# Patient Record
Sex: MALE | Race: BLACK OR AFRICAN AMERICAN | Employment: OTHER | URBAN - METROPOLITAN AREA
[De-identification: names, ages, dates, MRNs, and addresses within clinical notes are randomized per-mention and may not be internally consistent; named-entity substitution may affect disease eponyms.]

---

## 2022-03-19 PROBLEM — M79.605 PAIN OF LEFT LOWER EXTREMITY: Status: ACTIVE | Noted: 2017-01-17

## 2024-01-31 ENCOUNTER — ANESTHESIA EVENT (OUTPATIENT)
Facility: HOSPITAL | Age: 74
End: 2024-01-31
Payer: MEDICARE

## 2024-01-31 ENCOUNTER — TRANSCRIBE ORDERS (OUTPATIENT)
Facility: HOSPITAL | Age: 74
End: 2024-01-31

## 2024-01-31 ENCOUNTER — HOSPITAL ENCOUNTER (OUTPATIENT)
Facility: HOSPITAL | Age: 74
Discharge: HOME | End: 2024-02-03
Payer: MEDICARE

## 2024-01-31 DIAGNOSIS — K40.90 RIGHT INGUINAL HERNIA: Primary | ICD-10-CM

## 2024-01-31 DIAGNOSIS — K40.90 RIGHT INGUINAL HERNIA: ICD-10-CM

## 2024-01-31 DIAGNOSIS — D36.10 BENIGN NEOPLASM OF PERIPHERAL NERVE, UNSPECIFIED LOCATION: ICD-10-CM

## 2024-01-31 LAB
ANION GAP SERPL CALC-SCNC: 3 MMOL/L (ref 3–18)
BASOPHILS # BLD: 0 K/UL (ref 0–0.1)
BASOPHILS NFR BLD: 1 % (ref 0–2)
BUN SERPL-MCNC: 13 MG/DL (ref 7–18)
BUN/CREAT SERPL: 14 (ref 12–20)
CALCIUM SERPL-MCNC: 9.4 MG/DL (ref 8.5–10.1)
CHLORIDE SERPL-SCNC: 101 MMOL/L (ref 100–111)
CO2 SERPL-SCNC: 33 MMOL/L (ref 21–32)
CREAT SERPL-MCNC: 0.95 MG/DL (ref 0.6–1.3)
DIFFERENTIAL METHOD BLD: ABNORMAL
EKG ATRIAL RATE: 83 BPM
EKG DIAGNOSIS: NORMAL
EKG P AXIS: 55 DEGREES
EKG P-R INTERVAL: 148 MS
EKG Q-T INTERVAL: 362 MS
EKG QRS DURATION: 94 MS
EKG QTC CALCULATION (BAZETT): 425 MS
EKG R AXIS: 58 DEGREES
EKG T AXIS: 77 DEGREES
EKG VENTRICULAR RATE: 83 BPM
EOSINOPHIL # BLD: 0.2 K/UL (ref 0–0.4)
EOSINOPHIL NFR BLD: 6 % (ref 0–5)
ERYTHROCYTE [DISTWIDTH] IN BLOOD BY AUTOMATED COUNT: 12.2 % (ref 11.6–14.5)
GLUCOSE SERPL-MCNC: 77 MG/DL (ref 74–99)
HCT VFR BLD AUTO: 40.1 % (ref 36–48)
HGB BLD-MCNC: 12.8 G/DL (ref 13–16)
IMM GRANULOCYTES # BLD AUTO: 0 K/UL (ref 0–0.04)
IMM GRANULOCYTES NFR BLD AUTO: 0 % (ref 0–0.5)
LYMPHOCYTES # BLD: 1.6 K/UL (ref 0.9–3.6)
LYMPHOCYTES NFR BLD: 40 % (ref 21–52)
MCH RBC QN AUTO: 32.4 PG (ref 24–34)
MCHC RBC AUTO-ENTMCNC: 31.9 G/DL (ref 31–37)
MCV RBC AUTO: 101.5 FL (ref 78–100)
MONOCYTES # BLD: 0.6 K/UL (ref 0.05–1.2)
MONOCYTES NFR BLD: 15 % (ref 3–10)
NEUTS SEG # BLD: 1.6 K/UL (ref 1.8–8)
NEUTS SEG NFR BLD: 39 % (ref 40–73)
NRBC # BLD: 0 K/UL (ref 0–0.01)
NRBC BLD-RTO: 0 PER 100 WBC
PLATELET # BLD AUTO: 203 K/UL (ref 135–420)
PMV BLD AUTO: 9.6 FL (ref 9.2–11.8)
POTASSIUM SERPL-SCNC: 4.2 MMOL/L (ref 3.5–5.5)
RBC # BLD AUTO: 3.95 M/UL (ref 4.35–5.65)
SODIUM SERPL-SCNC: 137 MMOL/L (ref 136–145)
WBC # BLD AUTO: 4.1 K/UL (ref 4.6–13.2)

## 2024-01-31 PROCEDURE — 36415 COLL VENOUS BLD VENIPUNCTURE: CPT

## 2024-01-31 PROCEDURE — 80048 BASIC METABOLIC PNL TOTAL CA: CPT

## 2024-01-31 PROCEDURE — 93005 ELECTROCARDIOGRAM TRACING: CPT

## 2024-01-31 PROCEDURE — 85025 COMPLETE CBC W/AUTO DIFF WBC: CPT

## 2024-02-01 LAB
FAX TO NUMBER: NORMAL
TEST RESULTS FAXED TO: NORMAL

## 2024-02-07 ENCOUNTER — HOSPITAL ENCOUNTER (OUTPATIENT)
Facility: HOSPITAL | Age: 74
Setting detail: OUTPATIENT SURGERY
Discharge: HOME | End: 2024-02-07
Attending: SURGERY | Admitting: SURGERY
Payer: MEDICARE

## 2024-02-07 ENCOUNTER — ANESTHESIA (OUTPATIENT)
Facility: HOSPITAL | Age: 74
End: 2024-02-07
Payer: MEDICARE

## 2024-02-07 VITALS
TEMPERATURE: 97.5 F | HEART RATE: 73 BPM | SYSTOLIC BLOOD PRESSURE: 114 MMHG | BODY MASS INDEX: 23.39 KG/M2 | WEIGHT: 176.5 LBS | HEIGHT: 73 IN | RESPIRATION RATE: 15 BRPM | OXYGEN SATURATION: 100 % | DIASTOLIC BLOOD PRESSURE: 70 MMHG

## 2024-02-07 PROCEDURE — 88305 TISSUE EXAM BY PATHOLOGIST: CPT

## 2024-02-07 PROCEDURE — 3700000000 HC ANESTHESIA ATTENDED CARE: Performed by: SURGERY

## 2024-02-07 PROCEDURE — 6360000002 HC RX W HCPCS: Performed by: SURGERY

## 2024-02-07 PROCEDURE — 2580000003 HC RX 258: Performed by: SURGERY

## 2024-02-07 PROCEDURE — 3600000019 HC SURGERY ROBOT ADDTL 15MIN: Performed by: SURGERY

## 2024-02-07 PROCEDURE — 3600000009 HC SURGERY ROBOT BASE: Performed by: SURGERY

## 2024-02-07 PROCEDURE — 2709999900 HC NON-CHARGEABLE SUPPLY: Performed by: SURGERY

## 2024-02-07 PROCEDURE — 7100000010 HC PHASE II RECOVERY - FIRST 15 MIN: Performed by: SURGERY

## 2024-02-07 PROCEDURE — 88304 TISSUE EXAM BY PATHOLOGIST: CPT

## 2024-02-07 PROCEDURE — 2500000003 HC RX 250 WO HCPCS: Performed by: SURGERY

## 2024-02-07 PROCEDURE — 7100000011 HC PHASE II RECOVERY - ADDTL 15 MIN: Performed by: SURGERY

## 2024-02-07 PROCEDURE — C1781 MESH (IMPLANTABLE): HCPCS | Performed by: SURGERY

## 2024-02-07 PROCEDURE — 3700000001 HC ADD 15 MINUTES (ANESTHESIA): Performed by: SURGERY

## 2024-02-07 PROCEDURE — 6360000002 HC RX W HCPCS: Performed by: NURSE ANESTHETIST, CERTIFIED REGISTERED

## 2024-02-07 PROCEDURE — S2900 ROBOTIC SURGICAL SYSTEM: HCPCS | Performed by: SURGERY

## 2024-02-07 PROCEDURE — 2500000003 HC RX 250 WO HCPCS: Performed by: NURSE ANESTHETIST, CERTIFIED REGISTERED

## 2024-02-07 DEVICE — MESH SURG PROGRIP 6X6 CM POLY POLYLACTIC ACID MFIL KNIT RECTANG: Type: IMPLANTABLE DEVICE | Site: INGUINAL | Status: FUNCTIONAL

## 2024-02-07 RX ORDER — SODIUM CHLORIDE, SODIUM LACTATE, POTASSIUM CHLORIDE, CALCIUM CHLORIDE 600; 310; 30; 20 MG/100ML; MG/100ML; MG/100ML; MG/100ML
INJECTION, SOLUTION INTRAVENOUS CONTINUOUS
Status: CANCELLED | OUTPATIENT
Start: 2024-02-07

## 2024-02-07 RX ORDER — PROPOFOL 10 MG/ML
INJECTION, EMULSION INTRAVENOUS CONTINUOUS PRN
Status: DISCONTINUED | OUTPATIENT
Start: 2024-02-07 | End: 2024-02-07 | Stop reason: SDUPTHER

## 2024-02-07 RX ORDER — SODIUM CHLORIDE, SODIUM LACTATE, POTASSIUM CHLORIDE, CALCIUM CHLORIDE 600; 310; 30; 20 MG/100ML; MG/100ML; MG/100ML; MG/100ML
INJECTION, SOLUTION INTRAVENOUS CONTINUOUS
Status: DISCONTINUED | OUTPATIENT
Start: 2024-02-07 | End: 2024-02-07 | Stop reason: HOSPADM

## 2024-02-07 RX ORDER — FENTANYL CITRATE 50 UG/ML
INJECTION, SOLUTION INTRAMUSCULAR; INTRAVENOUS PRN
Status: DISCONTINUED | OUTPATIENT
Start: 2024-02-07 | End: 2024-02-07 | Stop reason: SDUPTHER

## 2024-02-07 RX ORDER — SODIUM CHLORIDE 9 MG/ML
INJECTION, SOLUTION INTRAVENOUS PRN
Status: CANCELLED | OUTPATIENT
Start: 2024-02-07

## 2024-02-07 RX ORDER — SODIUM CHLORIDE 0.9 % (FLUSH) 0.9 %
5-40 SYRINGE (ML) INJECTION EVERY 12 HOURS SCHEDULED
Status: CANCELLED | OUTPATIENT
Start: 2024-02-07

## 2024-02-07 RX ORDER — CIPROFLOXACIN 2 MG/ML
400 INJECTION, SOLUTION INTRAVENOUS ONCE
Status: COMPLETED | OUTPATIENT
Start: 2024-02-07 | End: 2024-02-07

## 2024-02-07 RX ORDER — HYDROMORPHONE HYDROCHLORIDE 1 MG/ML
0.25 INJECTION, SOLUTION INTRAMUSCULAR; INTRAVENOUS; SUBCUTANEOUS EVERY 5 MIN PRN
Status: CANCELLED | OUTPATIENT
Start: 2024-02-07

## 2024-02-07 RX ORDER — LABETALOL HYDROCHLORIDE 5 MG/ML
10 INJECTION, SOLUTION INTRAVENOUS
Status: CANCELLED | OUTPATIENT
Start: 2024-02-07

## 2024-02-07 RX ORDER — DIPHENHYDRAMINE HYDROCHLORIDE 50 MG/ML
12.5 INJECTION INTRAMUSCULAR; INTRAVENOUS
Status: CANCELLED | OUTPATIENT
Start: 2024-02-07 | End: 2024-02-08

## 2024-02-07 RX ORDER — ONDANSETRON 2 MG/ML
4 INJECTION INTRAMUSCULAR; INTRAVENOUS
Status: CANCELLED | OUTPATIENT
Start: 2024-02-07 | End: 2024-02-08

## 2024-02-07 RX ORDER — SODIUM CHLORIDE 0.9 % (FLUSH) 0.9 %
5-40 SYRINGE (ML) INJECTION PRN
Status: CANCELLED | OUTPATIENT
Start: 2024-02-07

## 2024-02-07 RX ORDER — LIDOCAINE HYDROCHLORIDE 20 MG/ML
INJECTION, SOLUTION EPIDURAL; INFILTRATION; INTRACAUDAL; PERINEURAL PRN
Status: DISCONTINUED | OUTPATIENT
Start: 2024-02-07 | End: 2024-02-07 | Stop reason: SDUPTHER

## 2024-02-07 RX ORDER — FENTANYL CITRATE 50 UG/ML
25 INJECTION, SOLUTION INTRAMUSCULAR; INTRAVENOUS EVERY 5 MIN PRN
Status: CANCELLED | OUTPATIENT
Start: 2024-02-07

## 2024-02-07 RX ORDER — DROPERIDOL 2.5 MG/ML
0.62 INJECTION, SOLUTION INTRAMUSCULAR; INTRAVENOUS
Status: CANCELLED | OUTPATIENT
Start: 2024-02-07 | End: 2024-02-08

## 2024-02-07 RX ADMIN — PROPOFOL 30 MG: 10 INJECTION, EMULSION INTRAVENOUS at 09:27

## 2024-02-07 RX ADMIN — PROPOFOL 75 MCG/KG/MIN: 10 INJECTION, EMULSION INTRAVENOUS at 09:25

## 2024-02-07 RX ADMIN — PROPOFOL 30 MG: 10 INJECTION, EMULSION INTRAVENOUS at 09:19

## 2024-02-07 RX ADMIN — CIPROFLOXACIN 400 MG: 2 INJECTION, SOLUTION INTRAVENOUS at 09:24

## 2024-02-07 RX ADMIN — FENTANYL CITRATE 25 MCG: 50 INJECTION, SOLUTION INTRAMUSCULAR; INTRAVENOUS at 09:39

## 2024-02-07 RX ADMIN — FENTANYL CITRATE 25 MCG: 50 INJECTION, SOLUTION INTRAMUSCULAR; INTRAVENOUS at 09:34

## 2024-02-07 RX ADMIN — FENTANYL CITRATE 25 MCG: 50 INJECTION, SOLUTION INTRAMUSCULAR; INTRAVENOUS at 09:28

## 2024-02-07 RX ADMIN — FENTANYL CITRATE 25 MCG: 50 INJECTION, SOLUTION INTRAMUSCULAR; INTRAVENOUS at 09:31

## 2024-02-07 RX ADMIN — SODIUM CHLORIDE, POTASSIUM CHLORIDE, SODIUM LACTATE AND CALCIUM CHLORIDE: 600; 310; 30; 20 INJECTION, SOLUTION INTRAVENOUS at 08:35

## 2024-02-07 RX ADMIN — SODIUM CHLORIDE, POTASSIUM CHLORIDE, SODIUM LACTATE AND CALCIUM CHLORIDE: 600; 310; 30; 20 INJECTION, SOLUTION INTRAVENOUS at 08:41

## 2024-02-07 RX ADMIN — LIDOCAINE HYDROCHLORIDE 60 MG: 20 INJECTION, SOLUTION EPIDURAL; INFILTRATION; INTRACAUDAL; PERINEURAL at 09:16

## 2024-02-07 ASSESSMENT — PAIN SCALES - GENERAL: PAINLEVEL_OUTOF10: 0

## 2024-02-07 ASSESSMENT — COPD QUESTIONNAIRES: CAT_SEVERITY: MILD

## 2024-02-07 NOTE — ANESTHESIA POSTPROCEDURE EVALUATION
Post-Anesthesia Evaluation and Assessment    Cardiovascular Function/Vital Signs/Pain Score:  Vitals  BP: 111/62  Temp: 97.2 °F (36.2 °C)  Temp Source: Temporal  Pulse: 73  Respirations: 15  SpO2: 98 %  Height: 185.4 cm (6' 1\")  Weight - Scale: 80.1 kg (176 lb 8 oz)  Pain Level: 0    Patient is status post Procedure(s):  OPEN REPAIR RIGHT INGUINAL HERNIA/ NEURECTOMY (SPEC POP MEDTRONIC DEFIBRILLATOR).    Nausea/Vomiting: Controlled.    Postoperative hydration reviewed and adequate.    Pain:  Managed    Mental Status and Level of Consciousness: Baseline and appropriate for discharge.    Adequate oxygenation and airway patent.    Complications related to anesthesia: None    Post-anesthesia assessment completed. No concerns.    Patient has met all discharge requirements.    Signed By: Erwin Ribeiro MD    February 7, 2024

## 2024-02-07 NOTE — DISCHARGE INSTRUCTIONS
Post-Operative Discharge Instructions  Wagner Zee M.D.  [address], [address], [place] [address] [phone]    Patient: Catherine March MRN: [id number]  [facility]: [id number]    YOB: 1950  Age: 73 y.o.  Sex: male    DOA: 2/7/2024 LOS: [unfilled]  Discharge Date: [unfilled]     Acute Diagnoses:  Inguinal hernia without obstruction or gangrene, recurrence not specified, unspecified laterality [K40.90]    Chronic Medical Diagnoses:  [unfilled]    Diet  Resume prior to surgery diet as tolerated.    Activity  Do not drive a car or operate any hazardous machinery the day of surgery.  Rest quietly today.  No bending or heavy lifting.  You may resume other prior to surgery activities as tolerated.  You may remove the bandage and shower in 1 day.    Drain / Wound Care  Follow all drain / wound care instructions exactly as explained by the Nurse at time of discharge.  Apply an ice pack to the surgical site for 48 hours.  Do not put any salves or ointments on the wound.  Allow it to form a dry scab.  Leave steri-strips / Dermabond alone.  They should be allowed to fall off on their own in 7-14 days.    Medications  It is important to take your medications exactly as they are prescribed.  Keep your medication in the bottles provided by the pharmacist, and keep a list of the medication names, dosages, and times they should be taken in your wallet.    Call 911 anytime you think you may need emergency care. For example, call if:  You passed out (lost consciousness).  You have severe trouble breathing.  You have sudden chest pain and shortness of breath.    Notify your Surgeon for any of the following:  Fever, chills, nausea, vomiting, severe abdominal pain or bleeding.  If you experience any redness or discharge or sign of infection.  Persistent nausea lasting more than 24 hours.    If you are unable to reach your Surgeon for any of the symptoms above, you should proceed directly to the nearest

## 2024-02-07 NOTE — BRIEF OP NOTE
Brief Postoperative Note      Patient: Catherine March  YOB: 1950  MRN: [id number]    Date of Procedure: 2/7/2024    Pre-Op Diagnosis Codes:     * Inguinal hernia without obstruction or gangrene, recurrence not specified, unspecified laterality [K40.90]    Post-Op Diagnosis: Same       Procedure(s):  OPEN REPAIR RIGHT INGUINAL HERNIA/ NEURECTOMY (SPEC POP MEDTRONIC DEFIBRILLATOR)    Surgeon(s):  Wagner Zee MD    Assistant:  Surgical Assistant: Marlena Winters    Anesthesia: General    Estimated Blood Loss (mL): Minimal    Complications: None    Specimens:   ID Type Source Tests Collected by Time Destination   A : right ilioinguinal nerve Tissue Abdomen SURGICAL PATHOLOGY Wagner Zee MD 2/7/2024 0936    B : right inguinal lipoma Tissue Abdomen SURGICAL PATHOLOGY Wagner Zee MD 2/7/2024 0938        Implants:  Implant Name Type Inv. Item Serial No.  Lot No. LRB No. Used Action   MESH SURG PROGRIP 6X6 CM POLY POLYLACTIC ACID MFIL KNIT RECTANG - [id number]  MESH SURG PROGRIP 6X6 CM POLY POLYLACTIC ACID MFIL KNIT RECTANG  MEDTRONIC COVIDIEN  SURGICAL-WD [id number] Right 1 Implanted         Drains: * No LDAs found *    Findings: RI      Electronically signed by Wagner Zee MD on 2/7/2024 at 9:51 AM

## 2024-02-07 NOTE — OP NOTE
OPERATIVE NOTE    Patient: Catherine Mrach MRN: [id number]  [facility]: [id number]    YOB: 1950  Age: 73 y.o.  Sex: male      Indications: This is a 73 y.o. year-old male who presents with Right inguinal hernia.     Date of Procedure: 2/7/2024     Preoperative Diagnosis: Right Inguinal Hernia/Neuroma    Postoperative Diagnosis:  Right  Inguinal Hernia/Neuroma     Procedure: Procedure(s):  OPEN REPAIR RIGHT INGUINAL HERNIA/ NEURECTOMY (SPEC POP MEDTRONIC DEFIBRILLATOR)    Surgeon(s): Surgeon(s) and Role:     * Wagner Zee MD - Primary    Anesthesia: General     Assistant:  Surgical Assistant: Marlena Winters  Scrub Person First: Bety Brooks  Scrub Person Second: Krystal Lancaster RN    Implants:  Implant Name Type Inv. Item Serial No.  Lot No. LRB No. Used Action   MESH SURG PROGRIP 6X6 CM POLY POLYLACTIC ACID MFIL KNIT RECTANG - [id number]  MESH SURG PROGRIP 6X6 CM POLY POLYLACTIC ACID MFIL KNIT RECTANG  MEDTR[employer] COVIDIEN  SURGICAL-WD [id number] Right 1 Implanted       Procedure: The patient was placed in the supine position. His abdomen and groin were prepped and draped in the usual fashion. An incision was made over the inguinal ligament with the knife and then carried down through the subcutaneous tissues with the electrocautery. The external oblique fibers were slightly divided and the patient was found to have a direct and indirect inguinal hernia. The spermatic cord and structures were retracted laterally using a soft Penrose drain. The ilioinguinal nerve was resected using the electrocautery. The indirect hernia sac was dissected away from the cord structures and ligated high at the internal ring using a 2-0 PDS suture ligature. The defect in the inguinal floor was reduced and then repaired using Progrip mesh. The mesh was fashioned, placed over the inguinal floor and allowed to south  to the pubic tubercle shelving edge of the inguinal ligament and transversalis fascia

## 2024-02-07 NOTE — H&P
History of Present Illness  1.  Inguinal hernia   , RLQ  There is radiation to groin.  The patient describes it as aching, dull and sharp.  Context includes physical activity.  Additional information: Has a LIH for some time and as wanted it repaired..        Problem List  Problem List reviewed.   Problem Description  Onset Date  Chronic  Clinical Status  Notes  Essential hypertension  04/04/2014  N      Depressive disorder  04/04/2014  Y      Arthropathy  04/04/2014  Y      Neck pain  04/04/2014  Y      Low back pain  04/04/2014  Y      Allergic rhinitis  04/04/2014  Y      Acquired arteriovenous fistula  06/09/2021  N      H/O lower GIT neoplasm  06/09/2021  N          Past Medical/Surgical History   (Detailed)  Disease/disorder  Onset Date  Management  Date  Comments      Knee replacement, total      Depression          High Blood Pressure              Knee replacement, total      Osteoarthritis              Colonoscopy with possible biopsies and/or polypectomies as needed          Active Medications (Prior to visit today)  Medication Name  Sig Description  Start Date  Stop Date  Refilled  Rx Elsewhere  docusate sodium 100 mg capsule  take 1 capsule by ORAL route 2 times every day at bedtime as needed  06/10/2016    06/10/2016  N  cetirizine 10 mg tablet  take 1 tablet by oral route  every day  10/15/2019      N  fluticasone propionate 50 mcg/actuation nasal spray,suspension  spray 1 spray by intranasal route  every day in each nostril  10/15/2019    10/15/2019  N  Colace 100 mg capsule  take 1 capsule by ORAL route 2 times every day at bedtime as needed  01/19/2021      N  Pyridium 200 mg tablet  take 1 tablet by oral route 3 times every day after meals  04/06/2021      N  ProAir HFA 90 mcg/actuation aerosol inhaler  inhale 2 puff by inhalation route  every 4 - 6 hours as needed  06/29/2021 06/29/2021  N  meclizine 25 mg tablet  take 1 tablet by oral route 3 times every day as

## 2024-02-07 NOTE — PERIOP NOTE
TRANSFER - IN REPORT:    Verbal report received from BIJAN Mast on Catherine March  being received from OR for routine progression of patient care      Report consisted of patient's Situation, Background, Assessment and   Recommendations(SBAR).     Information from the following report(s) Intake/Output and MAR was reviewed with the receiving nurse.    Opportunity for questions and clarification was provided.      Assessment completed upon patient's arrival to unit and care assumed.

## 2024-02-07 NOTE — PERIOP NOTE
Saadia informed via phone of pt last had tumeric three days ago and Boswellia two days ago. No new order were received.

## 2024-02-07 NOTE — ANESTHESIA PRE PROCEDURE
Department of Anesthesiology  Preprocedure Note       Name:  Catherine March   Age:  73 y.o.  :  1950                                          MRN:  [id number]         Date:  2024      Surgeon: Surgeon(s):  Wagner Zee MD    Procedure: Procedure(s):  ROBOTIC REPAIR RIGHT INGUINAL HERNIA/ NEURECTOMY (SPEC POP MEDTRONIC DEFIBRILLATOR)    Medications prior to admission:   Prior to Admission medications    Medication Sig Start Date End Date Taking? Authorizing Provider   acetaminophen (TYLENOL) 500 MG tablet Take 1 tablet by mouth every 4 hours as needed for Pain    Veronica Pittman MD   albuterol (PROVENTIL) (2.5 MG/3ML) 0.083% nebulizer solution Inhale 3 mLs into the lungs as needed    Veronica Pittman MD   budesonide (PULMICORT) 0.5 MG/2ML nebulizer suspension Take 2 mLs by nebulization 2 times daily    Veronica Pittman MD   budesonide-formoterol (SYMBICORT) 160-4.5 MCG/ACT AERO Inhale 2 puffs into the lungs 2 times daily 23   Veronica Pittman MD   carvedilol (COREG) 3.125 MG tablet Take 1 tablet by mouth 2 times daily 23   Veronica Pittman MD   docusate (COLACE, DULCOLAX) 100 MG CAPS Take 100 mg by mouth 2 times daily    Veronica Pittman MD   EPINEPHrine (EPIPEN) 0.3 MG/0.3ML SOAJ injection Inject 0.3 mLs into the skin once 23   Veronica Pittman MD   dupilumab (DUPIXENT) 300 MG/2ML SOSY injection Inject 2 mLs into the skin every 14 days 23  Veronica Pittman MD   sacubitril-valsartan (ENTRESTO) 24-26 MG per tablet Take 1 tablet by mouth 2 times daily    Veronica Pittman MD   furosemide (LASIX) 20 MG tablet Take 1 tablet by mouth daily    Veronica Pittman MD   fluticasone (FLONASE) 50 MCG/ACT nasal spray 2 sprays by Each Nostril route daily    Veronica Pittman MD   vitamin B-1 (THIAMINE) 100 MG tablet Take 1 tablet by mouth daily    Veronica Pittman MD   vitamin D (CHOLECALCIFEROL) 25 MCG (1000 UT) TABS tablet

## 2024-04-25 ENCOUNTER — HOSPITAL ENCOUNTER (OUTPATIENT)
Facility: HOSPITAL | Age: 74
Discharge: HOME | End: 2024-04-28

## 2024-04-25 ENCOUNTER — TRANSCRIBE ORDERS (OUTPATIENT)
Facility: HOSPITAL | Age: 74
End: 2024-04-25

## 2024-04-25 DIAGNOSIS — R97.20 ELEVATED PROSTATE SPECIFIC ANTIGEN (PSA): ICD-10-CM

## 2024-04-25 DIAGNOSIS — R97.20 ELEVATED PROSTATE SPECIFIC ANTIGEN (PSA): Primary | ICD-10-CM

## 2024-04-29 LAB
EKG ATRIAL RATE: 81 BPM
EKG DIAGNOSIS: NORMAL
EKG P AXIS: 57 DEGREES
EKG P-R INTERVAL: 156 MS
EKG Q-T INTERVAL: 364 MS
EKG QRS DURATION: 102 MS
EKG QTC CALCULATION (BAZETT): 422 MS
EKG R AXIS: 52 DEGREES
EKG T AXIS: 161 DEGREES
EKG VENTRICULAR RATE: 81 BPM

## 2024-06-19 ENCOUNTER — ANESTHESIA EVENT (OUTPATIENT)
Facility: HOSPITAL | Age: 74
End: 2024-06-19
Payer: MEDICARE

## 2024-06-25 ENCOUNTER — ANESTHESIA (OUTPATIENT)
Facility: HOSPITAL | Age: 74
End: 2024-06-25
Payer: MEDICARE

## 2024-06-25 ENCOUNTER — HOSPITAL ENCOUNTER (OUTPATIENT)
Facility: HOSPITAL | Age: 74
Setting detail: OUTPATIENT SURGERY
Discharge: HOME | End: 2024-06-25
Attending: UROLOGY | Admitting: UROLOGY
Payer: MEDICARE

## 2024-06-25 ENCOUNTER — APPOINTMENT (OUTPATIENT)
Facility: HOSPITAL | Age: 74
End: 2024-06-25
Attending: UROLOGY
Payer: MEDICARE

## 2024-06-25 VITALS
HEIGHT: 73 IN | SYSTOLIC BLOOD PRESSURE: 134 MMHG | OXYGEN SATURATION: 98 % | RESPIRATION RATE: 16 BRPM | WEIGHT: 181 LBS | BODY MASS INDEX: 23.99 KG/M2 | DIASTOLIC BLOOD PRESSURE: 84 MMHG | TEMPERATURE: 97.1 F | HEART RATE: 77 BPM

## 2024-06-25 DIAGNOSIS — R97.20 ELEVATED PSA: ICD-10-CM

## 2024-06-25 DIAGNOSIS — N40.1 BENIGN PROSTATIC HYPERPLASIA WITH LOWER URINARY TRACT SYMPTOMS, SYMPTOM DETAILS UNSPECIFIED: ICD-10-CM

## 2024-06-25 PROCEDURE — 3700000001 HC ADD 15 MINUTES (ANESTHESIA): Performed by: UROLOGY

## 2024-06-25 PROCEDURE — 3700000000 HC ANESTHESIA ATTENDED CARE: Performed by: UROLOGY

## 2024-06-25 PROCEDURE — 2720000010 HC SURG SUPPLY STERILE: Performed by: UROLOGY

## 2024-06-25 PROCEDURE — 7100000001 HC PACU RECOVERY - ADDTL 15 MIN: Performed by: UROLOGY

## 2024-06-25 PROCEDURE — 3600000012 HC SURGERY LEVEL 2 ADDTL 15MIN: Performed by: UROLOGY

## 2024-06-25 PROCEDURE — 7100000011 HC PHASE II RECOVERY - ADDTL 15 MIN: Performed by: UROLOGY

## 2024-06-25 PROCEDURE — 2580000003 HC RX 258: Performed by: UROLOGY

## 2024-06-25 PROCEDURE — 6360000002 HC RX W HCPCS: Performed by: NURSE ANESTHETIST, CERTIFIED REGISTERED

## 2024-06-25 PROCEDURE — 2709999900 HC NON-CHARGEABLE SUPPLY: Performed by: UROLOGY

## 2024-06-25 PROCEDURE — 3600000002 HC SURGERY LEVEL 2 BASE: Performed by: UROLOGY

## 2024-06-25 PROCEDURE — G0416 PROSTATE BIOPSY, ANY MTHD: HCPCS

## 2024-06-25 PROCEDURE — 6370000000 HC RX 637 (ALT 250 FOR IP): Performed by: ANESTHESIOLOGY

## 2024-06-25 PROCEDURE — 7100000010 HC PHASE II RECOVERY - FIRST 15 MIN: Performed by: UROLOGY

## 2024-06-25 PROCEDURE — 2500000003 HC RX 250 WO HCPCS: Performed by: NURSE ANESTHETIST, CERTIFIED REGISTERED

## 2024-06-25 PROCEDURE — 6360000002 HC RX W HCPCS: Performed by: UROLOGY

## 2024-06-25 PROCEDURE — 7100000000 HC PACU RECOVERY - FIRST 15 MIN: Performed by: UROLOGY

## 2024-06-25 PROCEDURE — 76942 ECHO GUIDE FOR BIOPSY: CPT

## 2024-06-25 RX ORDER — ACETAMINOPHEN 325 MG/1
650 TABLET ORAL
Status: COMPLETED | OUTPATIENT
Start: 2024-06-25 | End: 2024-06-25

## 2024-06-25 RX ORDER — LIDOCAINE HYDROCHLORIDE 20 MG/ML
INJECTION, SOLUTION EPIDURAL; INFILTRATION; INTRACAUDAL; PERINEURAL PRN
Status: DISCONTINUED | OUTPATIENT
Start: 2024-06-25 | End: 2024-06-25 | Stop reason: SDUPTHER

## 2024-06-25 RX ORDER — SODIUM CHLORIDE 9 MG/ML
INJECTION, SOLUTION INTRAVENOUS CONTINUOUS
Status: DISCONTINUED | OUTPATIENT
Start: 2024-06-25 | End: 2024-06-25 | Stop reason: HOSPADM

## 2024-06-25 RX ORDER — PHENYLEPHRINE HCL IN 0.9% NACL 1 MG/10 ML
SYRINGE (ML) INTRAVENOUS PRN
Status: DISCONTINUED | OUTPATIENT
Start: 2024-06-25 | End: 2024-06-25 | Stop reason: SDUPTHER

## 2024-06-25 RX ORDER — MIDAZOLAM HYDROCHLORIDE 1 MG/ML
INJECTION INTRAMUSCULAR; INTRAVENOUS PRN
Status: DISCONTINUED | OUTPATIENT
Start: 2024-06-25 | End: 2024-06-25 | Stop reason: SDUPTHER

## 2024-06-25 RX ORDER — ONDANSETRON 2 MG/ML
INJECTION INTRAMUSCULAR; INTRAVENOUS PRN
Status: DISCONTINUED | OUTPATIENT
Start: 2024-06-25 | End: 2024-06-25 | Stop reason: SDUPTHER

## 2024-06-25 RX ORDER — FENTANYL CITRATE 50 UG/ML
INJECTION, SOLUTION INTRAMUSCULAR; INTRAVENOUS PRN
Status: DISCONTINUED | OUTPATIENT
Start: 2024-06-25 | End: 2024-06-25 | Stop reason: SDUPTHER

## 2024-06-25 RX ORDER — EPHEDRINE SULFATE 5 MG/ML
INJECTION INTRAVENOUS PRN
Status: DISCONTINUED | OUTPATIENT
Start: 2024-06-25 | End: 2024-06-25 | Stop reason: SDUPTHER

## 2024-06-25 RX ORDER — PROPOFOL 10 MG/ML
INJECTION, EMULSION INTRAVENOUS PRN
Status: DISCONTINUED | OUTPATIENT
Start: 2024-06-25 | End: 2024-06-25 | Stop reason: SDUPTHER

## 2024-06-25 RX ORDER — DEXAMETHASONE SODIUM PHOSPHATE 4 MG/ML
INJECTION, SOLUTION INTRA-ARTICULAR; INTRALESIONAL; INTRAMUSCULAR; INTRAVENOUS; SOFT TISSUE PRN
Status: DISCONTINUED | OUTPATIENT
Start: 2024-06-25 | End: 2024-06-25 | Stop reason: SDUPTHER

## 2024-06-25 RX ADMIN — Medication 100 MCG: at 07:36

## 2024-06-25 RX ADMIN — Medication 100 MCG: at 07:48

## 2024-06-25 RX ADMIN — FENTANYL CITRATE 25 MCG: 50 INJECTION, SOLUTION INTRAMUSCULAR; INTRAVENOUS at 07:42

## 2024-06-25 RX ADMIN — LIDOCAINE HYDROCHLORIDE 80 MG: 20 INJECTION, SOLUTION EPIDURAL; INFILTRATION; INTRACAUDAL; PERINEURAL at 07:36

## 2024-06-25 RX ADMIN — Medication 100 MCG: at 08:05

## 2024-06-25 RX ADMIN — EPHEDRINE SULFATE 10 MG: 5 INJECTION INTRAVENOUS at 07:51

## 2024-06-25 RX ADMIN — SODIUM CHLORIDE: 9 INJECTION, SOLUTION INTRAVENOUS at 06:59

## 2024-06-25 RX ADMIN — ACETAMINOPHEN 650 MG: 325 TABLET ORAL at 09:47

## 2024-06-25 RX ADMIN — MIDAZOLAM 2 MG: 1 INJECTION INTRAMUSCULAR; INTRAVENOUS at 07:28

## 2024-06-25 RX ADMIN — ONDANSETRON HYDROCHLORIDE 4 MG: 2 INJECTION INTRAMUSCULAR; INTRAVENOUS at 07:41

## 2024-06-25 RX ADMIN — Medication 100 MCG: at 07:45

## 2024-06-25 RX ADMIN — FENTANYL CITRATE 25 MCG: 50 INJECTION, SOLUTION INTRAMUSCULAR; INTRAVENOUS at 08:22

## 2024-06-25 RX ADMIN — DEXAMETHASONE SODIUM PHOSPHATE 4 MG: 4 INJECTION INTRA-ARTICULAR; INTRALESIONAL; INTRAMUSCULAR; INTRAVENOUS; SOFT TISSUE at 07:40

## 2024-06-25 RX ADMIN — Medication 100 MCG: at 07:42

## 2024-06-25 RX ADMIN — WATER 2000 MG: 1 INJECTION INTRAMUSCULAR; INTRAVENOUS; SUBCUTANEOUS at 07:40

## 2024-06-25 RX ADMIN — PROPOFOL 200 MG: 10 INJECTION, EMULSION INTRAVENOUS at 07:36

## 2024-06-25 RX ADMIN — FENTANYL CITRATE 25 MCG: 50 INJECTION, SOLUTION INTRAMUSCULAR; INTRAVENOUS at 08:01

## 2024-06-25 ASSESSMENT — PAIN DESCRIPTION - DESCRIPTORS
DESCRIPTORS: BURNING;ACHING
DESCRIPTORS: BURNING;ACHING
DESCRIPTORS: ACHING

## 2024-06-25 ASSESSMENT — PAIN SCALES - GENERAL
PAINLEVEL_OUTOF10: 0
PAINLEVEL_OUTOF10: 3

## 2024-06-25 ASSESSMENT — PAIN - FUNCTIONAL ASSESSMENT
PAIN_FUNCTIONAL_ASSESSMENT: ACTIVITIES ARE NOT PREVENTED
PAIN_FUNCTIONAL_ASSESSMENT: 0-10
PAIN_FUNCTIONAL_ASSESSMENT: ACTIVITIES ARE NOT PREVENTED

## 2024-06-25 ASSESSMENT — COPD QUESTIONNAIRES: CAT_SEVERITY: MODERATE

## 2024-06-25 ASSESSMENT — PAIN DESCRIPTION - LOCATION: LOCATION: ABDOMEN;PENIS

## 2024-06-25 NOTE — PERIOP NOTE
Dr. Norman aware of last dose of Entresto and Coreg on 6/23/2024, hold both medications at this time. And will evaluate later if needed.

## 2024-06-25 NOTE — PERIOP NOTE
Reviewed PTA medication list with patient/caregiver and patient/caregiver denies any additional medications.     Patient admits to having a responsible adult care for them at home for at least 24 hours after surgery.    Patient encouraged to use gown warming system and informed that using said warming gown to regulate body temperature prior to a procedure has been shown to help reduce the risks of blood clots and infection.    Patient's pharmacy of choice verified and documented in PTA medication section.    Dual skin assessment & fall risk band verification completed with Matilda DEL REAL RN.

## 2024-06-25 NOTE — INTERVAL H&P NOTE
Update History & Physical    The patient's History and Physical of June 24, 2024 was reviewed with the patient and I examined the patient. There was no change. The surgical site was confirmed by the patient and me.     Plan: The risks, benefits, expected outcome, and alternative to the recommended procedure have been discussed with the patient. Patient understands and wants to proceed with the procedure.     Electronically signed by RUBEN TITUS MD on 6/25/2024 at 7:05 AM

## 2024-06-25 NOTE — ANESTHESIA POSTPROCEDURE EVALUATION
Department of Anesthesiology  Postprocedure Note    Patient: Catherine March  MRN: [id number]  YOB: 1950  Date of evaluation: 6/25/2024    Procedure Summary       Date: 06/25/24 Room / Location: [facility] MAIN CYSTO / [facility] MAIN OR    Anesthesia Start: 0728 Anesthesia Stop: 0844    Procedures:       TRANSRECTAL ULTRASOUND GUIDED PROSTATE BIOPSY (Rectum)      GREENLIGHT LASER VAPORIZATION OF THE PROSTATE \"SPEC POP\" PT HAS MEDTRONIC AICD (Bladder) Diagnosis:       Elevated PSA      Benign prostatic hyperplasia with lower urinary tract symptoms, symptom details unspecified      (Elevated PSA [R97.20])      (Benign prostatic hyperplasia with lower urinary tract symptoms, symptom details unspecified [N40.1])    Surgeons: Sherif Hubbard MD Responsible Provider: Jamaal Norman MD    Anesthesia Type: General ASA Status: 3            Anesthesia Type: General    Elizabeth Phase I: Elizabeth Score: 9    Elizabeth Phase II: Elizabeth Score: 9    Anesthesia Post Evaluation    Patient location during evaluation: PACU  Patient participation: complete - patient participated  Level of consciousness: awake and alert  Pain score: 0  Airway patency: patent  Nausea & Vomiting: no nausea and no vomiting  Cardiovascular status: blood pressure returned to baseline  Respiratory status: acceptable  Hydration status: euvolemic  Pain management: adequate        No notable events documented.

## 2024-06-25 NOTE — H&P
300 mg/2 mL subcutaneous syringe inject 2 milliliter by subcutaneous route  every 2 weeks in the abdomen, thigh, or upper arm rotating injection sites //   Y   Entresto 24 mg-26 mg tablet take 1 tablet by oral route 2 times every day //   Y   furosemide 20 mg tablet take 1 tablet by oral route  every day //   Y   montelukast 10 mg tablet take 1 tablet by oral route  every day in the evening //   Y   omeprazole 20 mg capsule,delayed release take 1 capsule by oral route  every day before a meal //   Y   apple cider vinegar 500 mg tablet take 1 tablet by mouth daily //   Y   Azo Men 500 mg capsule take 1 capsule by mouth daily //   Y   boswellia angeline extract (bulk) 70 % powder Take 1 scoop full once daily //   Y   Glucosamine 500 mg tablet take 2 tablets by mouth daily //   Y   saw palmetto 500 mg capsule take 1 capsule by mouth daily //   Y   Vitamin B-1 100 mg tablet take 1 tablet by mouth daily //   Y   Vitamin D3 25 mcg (1,000 unit) capsule take 3 capsules by mouth daily //   Y   Fleet Enema 19 gram-7 gram/118 mL take by Rectal route  3 hours prior to biopsy 2024   N   Cipro 500 mg tablet take 1 tablet by oral route  every 12 hours -- start the day before the biopsy 2024   N       Medication Reconciliation  Medications reconciled today.      Allergies  Ingredient Reaction (Severity) Comment   NO KNOWN ALLERGIES       Reviewed, no changes.      Family History   (Reviewed, updated)    Relationship Family Member Name  Age at Death Condition Onset Age Cause of Death   Brother    malignant neoplasm of lung 67 N   Sister    Cancer  N     Social History  (Reviewed, updated)  Tobacco use reviewed.    Preferred language is English.    The patient does not need an .      Marital Status/Family/Social Support  Marital status:      Tobacco use status: Never smoked tobacco.    Smoking status: Never smoker.    Tobacco Screening  Patient has never used tobacco. Patient has not used tobacco

## 2024-06-25 NOTE — ANESTHESIA PRE PROCEDURE
cardioverter/defibrillator) present 12/06/2023    has Medtronic AICD implanted by Dr. Lillie Green at [facility]   • Anxiety and depression     no meds as of 4/29/24.   • Arthritis     \"throughout my body\".   • Asthma 2009    adult onset   • CAD (coronary artery disease) 2023    hx heart cath 4/7/2023. [facility] cardiologist Dr. Dan   • Cardiomyopathy ([facility]) 2023    \"Irregular heart beat\" underwent Medtronic AICD   • Cerebral aneurysm 2018    hx cerebral angiogram by Dr. Keene. \"I haven't seen him in a while\"   • CHF (congestive heart failure) ([facility]) 2023    Lasix & Entresto   • Chronic pain 2007    due to arthritis. hx bilateral knee replacement   • COPD (chronic obstructive pulmonary disease) ([facility]) 2013    uses Symbicort BID. managed by Dr. Eric at [facility]   • Elevated PSA 2024    prostate biopsy & greenlight laser vaporization procedure scheduled 5/14/24 with Dr. Hubbard   • GERD (gastroesophageal reflux disease) 2012    Pantoprazole daily   • Hyperlipidemia     no meds as of 4/29/24   • Hypertension 1999    PCP Dr. Louie Koch   • TEZ on CPAP 2011    bring cpap on dos   • Urinary frequency    • Wears partial dentures     partial two front teeth; no adhesive used   • Wears prescription eyeglasses        Past Surgical History:        Procedure Laterality Date   • CARDIAC CATHETERIZATION Bilateral 04/07/2023    Dr. Dan   • CARDIAC DEFIBRILLATOR PLACEMENT  10/06/2023    Medtronic AICD implanted at [facility] by Dr. Lillie Green   • CATARACT EXTRACTION, BILATERAL Bilateral 2011   • CEREBRAL ANGIOGRAM  10/12/2018    Dr. Keene   • COLONOSCOPY  2011   • ESOPHAGOGASTRODUODENOSCOPY  2011   • HERNIA REPAIR N/A 02/07/2024    open repair right inguinal hernia-Dr. Zee   • LASIK     • SINUS SURGERY     • SPINE SURGERY  2002    [facility]   • TOTAL KNEE ARTHROPLASTY Bilateral 2007    left knee 2007, right knee 2014       Social History:    Social History     Tobacco Use   • Smoking status:

## 2024-06-25 NOTE — PERIOP NOTE
TRANSFER - IN REPORT:    Verbal report received from Carole shabazz on Catherine March  being received from pacu for routine post-op      Report consisted of patient's Situation, Background, Assessment and   Recommendations(SBAR).     Information from the following report(s) Nurse Handoff Report was reviewed with the receiving nurse.    Opportunity for questions and clarification was provided.      Assessment completed upon patient's arrival to unit and care assumed.

## 2024-06-25 NOTE — DISCHARGE INSTRUCTIONS
Hydrate well.     Continue to take the Levofloxacin antibiotic every day until it runs out.        DISCHARGE SUMMARY from Nurse    PATIENT INSTRUCTIONS:    After general anesthesia or intravenous sedation, for 24 hours or while taking prescription Narcotics:  Limit your activities  Do not drive and operate hazardous machinery  Do not make important personal or business decisions  Do  not drink alcoholic beverages  If you have not urinated within 8 hours after discharge, please contact your surgeon on call.    Report the following to your surgeon:  Excessive pain, swelling, redness or odor of or around the surgical area  Temperature over 100.5  Nausea and vomiting lasting longer than 4 hours or if unable to take medications  Any signs of decreased circulation or nerve impairment to extremity: change in color, persistent  numbness, tingling, coldness or increase pain  Any questions    What to do at Home:  Recommended activity: ,   REGULAR DIET   EMPTY LEG BAG AS NEEDED  OK TO SHOWER  DR TITUS WILL CALL REGARDING A POST OP CHECK UP    If you experience any of the following symptoms heavy bleeding, no urine produced, fevers, severe pain, please follow up with dr titus.    *  Please give a list of your current medications to your Primary Care Provider.    *  Please update this list whenever your medications are discontinued, doses are      changed, or new medications (including over-the-counter products) are added.    *  Please carry medication information at all times in case of emergency situations.    These are general instructions for a healthy lifestyle:    No smoking/ No tobacco products/ Avoid exposure to second hand smoke  Surgeon General's Warning:  Quitting smoking now greatly reduces serious risk to your health.    Obesity, smoking, and sedentary lifestyle greatly increases your risk for illness    A healthy diet, regular physical exercise & weight monitoring are important for maintaining a healthy

## 2024-06-25 NOTE — OP NOTE
Operative Note      Patient: Catherine March  YOB: 1950  MRN: [id number]    Date of Procedure: 6/25/2024    Pre-Op Diagnosis Codes:     * Elevated PSA [R97.20]     * Benign prostatic hyperplasia with lower urinary tract symptoms, symptom details unspecified [N40.1]    Post-Op Diagnosis: Same       Procedure(s):  GREENLIGHT LASER VAPORIZATION OF THE PROSTATE  TRANSRECTAL ULTRASOUND, PROSTATE BIOPSY    Surgeon(s):  Sherif Hubbard MD    Assistant:   * No surgical staff found *    Anesthesia: general    Estimated Blood Loss (mL): Minimal    Complications: None    Specimens:   ID Type Source Tests Collected by Time Destination   A : right base Tissue Prostate SURGICAL PATHOLOGY Sherif Hubbard MD 6/25/2024 0717    B : left apex Tissue Prostate SURGICAL PATHOLOGY Sherif Hubbard MD 6/25/2024 0718    C : left middle Tissue Prostate SURGICAL PATHOLOGY Sherif Hubbard MD 6/25/2024 0718    D : left base Tissue Prostate SURGICAL PATHOLOGY Sherif Hubbard MD 6/25/2024 0718    E : right apex Tissue Prostate SURGICAL PATHOLOGY Sherif Hubbard MD 6/25/2024 0718    F : right middle Tissue Prostate SURGICAL PATHOLOGY Sherif Hubbard MD 6/25/2024 0718        Implants:  * No implants in log *      Drains:   Urinary Catheter 06/25/24 Coude (Active)       Findings:  Infection Present At Time Of Surgery (PATOS) (choose all levels that have infection present):  No infection present  Other Findings: short prostate with elevated bladder neck and median lobe.  A total of 130,362 joules used.  Prostate volume was 44.68 ml post laser vaporization.         DESCRIPTION OF PROCEDURE:  After informed consent was obtained, the patient was taken to the operating room, and he underwent laryngeal mask anesthesia in the supine position.  He was then prepped and draped in the usual surgical fashion after being placed in the dorsal lithotomy position.      A 23-Wolof laser cystoscope was inserted with visual obturator per urethra

## 2024-06-25 NOTE — PERIOP NOTE
TRANSFER - IN REPORT:    Verbal report received from ORN & CRNA on Catherine Anca  being received from OR  for routine post-op      Report consisted of patient's Situation, Background, Assessment and   Recommendations(SBAR).     Information from the following report(s) Nurse Handoff Report was reviewed with the receiving nurse.    Opportunity for questions and clarification was provided.      Assessment completed upon patient's arrival to unit and care assumed.

## (undated) DEVICE — GLOVE ORANGE PI 7 1/2   MSG9075

## (undated) DEVICE — GARMENT,MEDLINE,DVT,INT,CALF,MED, GEN2: Brand: MEDLINE

## (undated) DEVICE — LIQUIBAND RAPID ADHESIVE 36/CS 0.8ML: Brand: MEDLINE

## (undated) DEVICE — LAP CHOLE: Brand: MEDLINE INDUSTRIES, INC.

## (undated) DEVICE — SOLUTION IRRIG 3000ML STRL H2O USP UROMATIC PLAS CONT

## (undated) DEVICE — MAX-CORE® DISPOSABLE CORE BIOPSY INSTRUMENT, 18G X 25CM: Brand: MAX-CORE

## (undated) DEVICE — SUTURE VCRL + SZ 3-0 L27IN ABSRB UD L26MM SH 1/2 CIR VCP416H

## (undated) DEVICE — SPONGE GZ W4XL4IN COT 12 PLY TYP VII WVN C FLD DSGN STERILE

## (undated) DEVICE — SOLUTION IRRIG 500ML 0.9% SOD CHLO USP POUR PLAS BTL

## (undated) DEVICE — SUTURE PDS II SZ 2-0 L27IN ABSRB VLT L26MM CT-2 1/2 CIR Z333H

## (undated) DEVICE — GLOVE SURG SZ 8 L12IN FNGR THK79MIL GRN LTX FREE

## (undated) DEVICE — INSUFFLATION NEEDLE TO ESTABLISH PNEUMOPERITONEUM.: Brand: INSUFFLATION NEEDLE

## (undated) DEVICE — SOLUTION IRRIG 3000ML 0.9% SOD CHL FLX CONT 0797208] ICU MEDICAL INC]

## (undated) DEVICE — CATHETER URETH 22FR BLLN 5CC SIL HYDRGEL 2 W F SPEC CARS M

## (undated) DEVICE — SET ADMIN 25ML L117IN PMP MOD CK VLV RLER CLMP 2 SMRTSITE

## (undated) DEVICE — SOLUTION IRRIG 1500ML 0.9% SOD CHL USP POUR PLAS BTL

## (undated) DEVICE — PAD,NON-ADHERENT,3X8,STERILE,LF,1/PK: Brand: MEDLINE

## (undated) DEVICE — POUCH DRNGE FLX BND INTEGR RAIL CLMP DISP EZ CTCH

## (undated) DEVICE — Device

## (undated) DEVICE — SUTURE MCRYL SZ 4-0 L18IN ABSRB UD L19MM PS-2 3/8 CIR PRIM Y496G

## (undated) DEVICE — CYSTO PACK: Brand: MEDLINE INDUSTRIES, INC.

## (undated) DEVICE — ELECTRODE PT RET AD L9FT HI MOIST COND ADH HYDRGEL CORDED

## (undated) DEVICE — SOLUTION ANTIFOG VIS SYS CLEARIFY LAPSCP

## (undated) DEVICE — [FOUR SPIKE IRRIGATOR SET,  NON-PYROGENIC FLUID PATH,  DO NOT USE IF PACKAGE IS DAMAGED]